# Patient Record
Sex: FEMALE
[De-identification: names, ages, dates, MRNs, and addresses within clinical notes are randomized per-mention and may not be internally consistent; named-entity substitution may affect disease eponyms.]

---

## 2022-11-19 ENCOUNTER — TRANSCRIPTION ENCOUNTER (OUTPATIENT)
Age: 22
End: 2022-11-19

## 2022-11-19 ENCOUNTER — APPOINTMENT (OUTPATIENT)
Dept: AFTER HOURS CARE | Facility: EMERGENCY ROOM | Age: 22
End: 2022-11-19

## 2022-11-19 DIAGNOSIS — J06.9 ACUTE UPPER RESPIRATORY INFECTION, UNSPECIFIED: ICD-10-CM

## 2022-11-19 PROBLEM — Z00.00 ENCOUNTER FOR PREVENTIVE HEALTH EXAMINATION: Status: ACTIVE | Noted: 2022-11-19

## 2022-11-19 PROCEDURE — 99203 OFFICE O/P NEW LOW 30 MIN: CPT | Mod: 95

## 2022-11-19 NOTE — ASSESSMENT
[FreeTextEntry1] : 22yoF p/w URI, well appearing\par -conservative management: tylenol/motrin prn\par -covid/rvp swab to be done at [facility]\par -seek immediate attention for: chest pain, shortness or breath, dizziness, intractable vomiting, or any other acute causes of concern.

## 2022-11-19 NOTE — REVIEW OF SYSTEMS
[Fever] : fever [Chills] : chills [Fatigue] : fatigue [Nasal Discharge] : nasal discharge [Sore Throat] : sore throat [Postnasal Drip] : postnasal drip [Cough] : cough [Negative] : Eyes [Chest Pain] : no chest pain [Shortness Of Breath] : no shortness of breath [Abdominal Pain] : no abdominal pain [Nausea] : no nausea [Vomiting] : no vomiting

## 2022-11-19 NOTE — PLAN
[FreeTextEntry1] : 22yoF p/w URI, well appearing\par -conservative management: tylenol/motrin prn\par -covid/rvp swab to be done at [facility]\par -seek immediate attention for: chest pain, shortness or breath, dizziness, intractable vomiting, or any other acute causes of concern.

## 2022-11-19 NOTE — HISTORY OF PRESENT ILLNESS
[Home] : at home, [unfilled] , at the time of the visit. [Other Location: e.g. Home (Enter Location, City,State)___] : at [unfilled] [Verbal consent obtained from patient] : the patient, [unfilled] [FreeTextEntry8] : 22yoF; with no signif pmh; now p/w cough, sore throat and fever(subjective) for 3 days.  denies cp/sob/palp. denies abd pain. denies n/v/d. denies dizziness. states she is scheduled to take a covid pcr tomorrow at [facility]. requesting a school note because she has exams on monday and unable to study 2/2 illness and feels too unwell to go to school.\par PMH: denies\par ALL: denies

## 2022-11-19 NOTE — PHYSICAL EXAM
[No Acute Distress] : no acute distress [Well-Appearing] : well-appearing [No Respiratory Distress] : no respiratory distress  [No Accessory Muscle Use] : no accessory muscle use [de-identified] : EXAM: NAD, no nasal flaring, no tachypnea, speaking in full sentences with on dyspnea

## 2023-10-02 ENCOUNTER — APPOINTMENT (OUTPATIENT)
Dept: ORTHOPEDIC SURGERY | Facility: CLINIC | Age: 23
End: 2023-10-02